# Patient Record
Sex: FEMALE | Employment: UNEMPLOYED | URBAN - METROPOLITAN AREA
[De-identification: names, ages, dates, MRNs, and addresses within clinical notes are randomized per-mention and may not be internally consistent; named-entity substitution may affect disease eponyms.]

---

## 2022-10-13 ENCOUNTER — HOSPITAL ENCOUNTER (INPATIENT)
Age: 22
LOS: 2 days | Discharge: HOME | End: 2022-10-15
Attending: OBSTETRICS & GYNECOLOGY | Admitting: OBSTETRICS & GYNECOLOGY

## 2022-10-13 PROBLEM — O09.90 HIGH-RISK PREGNANCY, UNSPECIFIED TRIMESTER: Status: ACTIVE | Noted: 2022-10-13

## 2022-10-13 LAB
ABO/RH: NORMAL
ABSOLUTE EOS #: 0.07 K/UL (ref 0–0.44)
ABSOLUTE IMMATURE GRANULOCYTE: <0.03 K/UL (ref 0–0.3)
ABSOLUTE LYMPH #: 1.9 K/UL (ref 1.1–3.7)
ABSOLUTE MONO #: 0.72 K/UL (ref 0.1–1.2)
ALBUMIN SERPL-MCNC: 3.3 G/DL (ref 3.5–5.2)
ALBUMIN/GLOBULIN RATIO: 1.3 (ref 1–2.5)
ALP BLD-CCNC: 117 U/L (ref 35–104)
ALT SERPL-CCNC: 17 U/L (ref 5–33)
AMPHETAMINE SCREEN URINE: NEGATIVE
ANION GAP SERPL CALCULATED.3IONS-SCNC: 10 MMOL/L (ref 9–17)
ANTIBODY SCREEN: NEGATIVE
ARM BAND NUMBER: NORMAL
AST SERPL-CCNC: 16 U/L
BARBITURATE SCREEN URINE: NEGATIVE
BASOPHILS # BLD: 0 % (ref 0–2)
BASOPHILS ABSOLUTE: <0.03 K/UL (ref 0–0.2)
BENZODIAZEPINE SCREEN, URINE: NEGATIVE
BILIRUB SERPL-MCNC: 0.2 MG/DL (ref 0.3–1.2)
BUN BLDV-MCNC: 10 MG/DL (ref 6–20)
CALCIUM SERPL-MCNC: 9.2 MG/DL (ref 8.6–10.4)
CANNABINOID SCREEN URINE: NEGATIVE
CHLORIDE BLD-SCNC: 105 MMOL/L (ref 98–107)
CO2: 22 MMOL/L (ref 20–31)
COCAINE METABOLITE, URINE: NEGATIVE
CREAT SERPL-MCNC: 0.53 MG/DL (ref 0.5–0.9)
CREATININE URINE: 109.4 MG/DL (ref 28–217)
EOSINOPHILS RELATIVE PERCENT: 1 % (ref 1–4)
EXPIRATION DATE: NORMAL
FENTANYL URINE: NEGATIVE
GFR SERPL CREATININE-BSD FRML MDRD: >60 ML/MIN/1.73M2
GLUCOSE BLD-MCNC: 92 MG/DL (ref 70–99)
HCT VFR BLD CALC: 36.4 % (ref 36.3–47.1)
HEMOGLOBIN: 12.7 G/DL (ref 11.9–15.1)
HIV AG/AB: NONREACTIVE
IMMATURE GRANULOCYTES: 0 %
LYMPHOCYTES # BLD: 25 % (ref 24–43)
MCH RBC QN AUTO: 29.8 PG (ref 25.2–33.5)
MCHC RBC AUTO-ENTMCNC: 34.9 G/DL (ref 28.4–34.8)
MCV RBC AUTO: 85.4 FL (ref 82.6–102.9)
METHADONE SCREEN, URINE: NEGATIVE
MONOCYTES # BLD: 10 % (ref 3–12)
NRBC AUTOMATED: 0 PER 100 WBC
OPIATES, URINE: NEGATIVE
OXYCODONE SCREEN URINE: NEGATIVE
PDW BLD-RTO: 13.6 % (ref 11.8–14.4)
PHENCYCLIDINE, URINE: NEGATIVE
PLATELET # BLD: 209 K/UL (ref 138–453)
PMV BLD AUTO: 11.8 FL (ref 8.1–13.5)
POTASSIUM SERPL-SCNC: 3.9 MMOL/L (ref 3.7–5.3)
RBC # BLD: 4.26 M/UL (ref 3.95–5.11)
RUBV IGG SER QL: <0.2 IU/ML
SEG NEUTROPHILS: 64 % (ref 36–65)
SEGMENTED NEUTROPHILS ABSOLUTE COUNT: 4.74 K/UL (ref 1.5–8.1)
SODIUM BLD-SCNC: 137 MMOL/L (ref 135–144)
T. PALLIDUM, IGG: NONREACTIVE
TEST INFORMATION: NORMAL
TOTAL PROTEIN, URINE: 12 MG/DL
TOTAL PROTEIN: 5.8 G/DL (ref 6.4–8.3)
URINE TOTAL PROTEIN CREATININE RATIO: 0.11 (ref 0–0.2)
WBC # BLD: 7.5 K/UL (ref 3.5–11.3)

## 2022-10-13 PROCEDURE — 86900 BLOOD TYPING SEROLOGIC ABO: CPT

## 2022-10-13 PROCEDURE — 86901 BLOOD TYPING SEROLOGIC RH(D): CPT

## 2022-10-13 PROCEDURE — 87389 HIV-1 AG W/HIV-1&-2 AB AG IA: CPT

## 2022-10-13 PROCEDURE — 86780 TREPONEMA PALLIDUM: CPT

## 2022-10-13 PROCEDURE — 86803 HEPATITIS C AB TEST: CPT

## 2022-10-13 PROCEDURE — 6370000000 HC RX 637 (ALT 250 FOR IP)

## 2022-10-13 PROCEDURE — 86850 RBC ANTIBODY SCREEN: CPT

## 2022-10-13 PROCEDURE — 2580000003 HC RX 258: Performed by: STUDENT IN AN ORGANIZED HEALTH CARE EDUCATION/TRAINING PROGRAM

## 2022-10-13 PROCEDURE — 80053 COMPREHEN METABOLIC PANEL: CPT

## 2022-10-13 PROCEDURE — 82570 ASSAY OF URINE CREATININE: CPT

## 2022-10-13 PROCEDURE — 85025 COMPLETE CBC W/AUTO DIFF WBC: CPT

## 2022-10-13 PROCEDURE — 84156 ASSAY OF PROTEIN URINE: CPT

## 2022-10-13 PROCEDURE — 80307 DRUG TEST PRSMV CHEM ANLYZR: CPT

## 2022-10-13 PROCEDURE — 86762 RUBELLA ANTIBODY: CPT

## 2022-10-13 PROCEDURE — 3E0DXGC INTRODUCTION OF OTHER THERAPEUTIC SUBSTANCE INTO MOUTH AND PHARYNX, EXTERNAL APPROACH: ICD-10-PCS | Performed by: OBSTETRICS & GYNECOLOGY

## 2022-10-13 PROCEDURE — 1220000000 HC SEMI PRIVATE OB R&B

## 2022-10-13 PROCEDURE — 87340 HEPATITIS B SURFACE AG IA: CPT

## 2022-10-13 PROCEDURE — 3E033VJ INTRODUCTION OF OTHER HORMONE INTO PERIPHERAL VEIN, PERCUTANEOUS APPROACH: ICD-10-PCS | Performed by: OBSTETRICS & GYNECOLOGY

## 2022-10-13 RX ORDER — ONDANSETRON 2 MG/ML
4 INJECTION INTRAMUSCULAR; INTRAVENOUS EVERY 6 HOURS PRN
Status: DISCONTINUED | OUTPATIENT
Start: 2022-10-13 | End: 2022-10-15 | Stop reason: SDUPTHER

## 2022-10-13 RX ORDER — SODIUM CHLORIDE 9 MG/ML
25 INJECTION, SOLUTION INTRAVENOUS PRN
Status: DISCONTINUED | OUTPATIENT
Start: 2022-10-13 | End: 2022-10-15

## 2022-10-13 RX ORDER — LIDOCAINE HYDROCHLORIDE 10 MG/ML
30 INJECTION, SOLUTION EPIDURAL; INFILTRATION; INTRACAUDAL; PERINEURAL PRN
Status: DISCONTINUED | OUTPATIENT
Start: 2022-10-13 | End: 2022-10-15

## 2022-10-13 RX ORDER — ACETAMINOPHEN 500 MG
1000 TABLET ORAL EVERY 4 HOURS PRN
Status: DISCONTINUED | OUTPATIENT
Start: 2022-10-13 | End: 2022-10-15

## 2022-10-13 RX ORDER — SODIUM CHLORIDE 0.9 % (FLUSH) 0.9 %
5-40 SYRINGE (ML) INJECTION EVERY 12 HOURS SCHEDULED
Status: DISCONTINUED | OUTPATIENT
Start: 2022-10-13 | End: 2022-10-15

## 2022-10-13 RX ORDER — DIPHENHYDRAMINE HCL 25 MG
25 TABLET ORAL EVERY 4 HOURS PRN
Status: DISCONTINUED | OUTPATIENT
Start: 2022-10-13 | End: 2022-10-15

## 2022-10-13 RX ORDER — SODIUM CHLORIDE, SODIUM LACTATE, POTASSIUM CHLORIDE, CALCIUM CHLORIDE 600; 310; 30; 20 MG/100ML; MG/100ML; MG/100ML; MG/100ML
INJECTION, SOLUTION INTRAVENOUS CONTINUOUS
Status: DISCONTINUED | OUTPATIENT
Start: 2022-10-13 | End: 2022-10-15

## 2022-10-13 RX ORDER — SODIUM CHLORIDE 0.9 % (FLUSH) 0.9 %
5-40 SYRINGE (ML) INJECTION PRN
Status: DISCONTINUED | OUTPATIENT
Start: 2022-10-13 | End: 2022-10-15

## 2022-10-13 RX ORDER — SODIUM CHLORIDE, SODIUM LACTATE, POTASSIUM CHLORIDE, AND CALCIUM CHLORIDE .6; .31; .03; .02 G/100ML; G/100ML; G/100ML; G/100ML
500 INJECTION, SOLUTION INTRAVENOUS PRN
Status: DISCONTINUED | OUTPATIENT
Start: 2022-10-13 | End: 2022-10-15

## 2022-10-13 RX ORDER — SODIUM CHLORIDE, SODIUM LACTATE, POTASSIUM CHLORIDE, AND CALCIUM CHLORIDE .6; .31; .03; .02 G/100ML; G/100ML; G/100ML; G/100ML
1000 INJECTION, SOLUTION INTRAVENOUS PRN
Status: DISCONTINUED | OUTPATIENT
Start: 2022-10-13 | End: 2022-10-15

## 2022-10-13 RX ADMIN — Medication 25 MCG: at 21:03

## 2022-10-13 RX ADMIN — SODIUM CHLORIDE, POTASSIUM CHLORIDE, SODIUM LACTATE AND CALCIUM CHLORIDE: 600; 310; 30; 20 INJECTION, SOLUTION INTRAVENOUS at 17:55

## 2022-10-13 NOTE — FLOWSHEET NOTE
Pt came in LDR, ambulatory, called for referral of care due to elevated blood pressure at home, pt denies abdominal pain, no vaginal bleeding, no watery discharge or leaking noted, with good fetal movement noted. Pt denies epigastric pain, blurring of vision, dizziness, headache and no nausea ad vomiting.

## 2022-10-13 NOTE — H&P
[facility]    Date: 10/13/2022       Time: 6:43 PM   Patient Name: Qiana Stuart     Patient : 2000  Room/Bed: 0705/0705-01    Admission Date/Time: 10/13/2022  4:11 PM      CC: Elevated blood pressures     HPI: Qiana Stuart is a 20 y.o. Lola Rival at [facility] who presents with elevated blood pressures over the last several days. The patient belongs to the [facility], and was planning a home delivery. During an appointment with midwife, patient had elevated pressures that are self-reported as being in the 079M systolic. After resting, patient reported pressures dropped to 399R systolic. She was referred to outside hospital, which was unable to manage these pressures adequately. At this point, patient was transferred to [facility], and blood pressure on admission was 147/96 and decreased to 131/89. She endorses swelling in the lower extremities, which has been persistent throughout the entirety of her pregnancy. She also reports headache, which she has had since the age of 14. Patient denies visual changes, chest pain, shortness of breath, abdominal pain, trouble urinating, or other symptoms. The patient only received early labs in this pregnancy, which were not provided. The patient reports fetal movement is present, denies contractions, denies loss of fluid, denies vaginal bleeding. Patient denies nausea, vomiting, fever, chills, shortness of breath, chest pain, RUQ pain, abdominal pain, diarrhea, change in color/amount/odor of vaginal discharge, dysuria or, hematuria. DATING:  LMP: Patient's last menstrual period was 2022. Estimated Date of Delivery: None noted.    Based on: best clinical estimate, patient is 38 weeks 6 days gestational age    PREGNANCY RISK FACTORS:  Patient Active Problem List   Diagnosis    High-risk pregnancy, unspecified trimester        Steroids Given In This Pregnancy:  no     REVIEW OF SYSTEMS: Constitutional: negative fever, negative chills, negative weight changes   HEENT: negative visual disturbances, + headaches, negative dizziness, negative hearing loss  Breast: Negative breast abnormalities, negative breast lumps, negative nipple discharge  Respiratory: negative dyspnea, negative cough, negative SOB  Cardiovascular: negative chest pain,  negative palpitations, negative arrhythmia, negative syncope   Gastrointestinal: negative abdominal pain, negative RUQ pain, negative N/V, negative diarrhea, negative constipation, negative bowel changes, negative heartburn   Genitourinary: negative dysuria, negative hematuria, negative urinary incontinence, negative vaginal discharge, negative vaginal bleeding or spotting  Dermatological: negative rash, negative pruritis, negative mole or other skin changes  Hematologic: negative bruising  Immunologic/Lymphatic: negative recent illness, negative recent sick contact  Musculoskeletal: negative back pain, negative myalgias, negative arthralgias  Neurological:  negative dizziness, negative migraines, negative seizures, negative weakness  Behavior/Psych: negative depression, negative anxiety, negative SI, negative HI    OBSTETRIC HISTORY:   OB History    Para Term  AB Living   3 1 0 0 1 0   SAB IAB Ectopic Molar Multiple Live Births   0 0 0 0 0 0      # Outcome Date GA Lbr Chirag/2nd Weight Sex Delivery Anes PTL Lv   3 Current            2 AB            1 Para                PAST MEDICAL HISTORY:   has no past medical history on file. PAST SURGICAL HISTORY:   has no past surgical history on file. ALLERGIES:  has No Known Allergies. MEDICATIONS:  Prior to Admission medications    Not on File       FAMILY HISTORY:  family history includes Diabetes in her mother; Heart Surgery in her sister; Hypertension in her mother. SOCIAL HISTORY:   reports that she has never smoked.  She has never used smokeless tobacco. She reports that she does not drink alcohol and does not use drugs. VITALS:  Vitals:    10/13/22 1625 10/13/22 1639 10/13/22 1758   BP: (!) 147/96 (!) 141/95 131/89   Pulse: 90 90 91   Resp: 16 17 18   Temp: 98.2 °F (36.8 °C)     TempSrc: Oral     SpO2: 99% 99%            PHYSICAL EXAM:  Fetal Heart Monitor:  Baseline Heart Rate 155, moderate variability, present accelerations, absent decelerations  [place]: contractions absent  General appearance:  no apparent distress, alert and cooperative  HEENT: head atraumatic, normocephalic, trachea midline, moist mucous membranes    Neurologic:  oriented, normal speech, no focal findings or movement disorder noted  Lungs:  no increased work of breathing, good air exchange. No use of accessory muscle, no cyanosis. Heart:  regular rate, no cyanosis. Patient wearing compression stockings, unable to appreciate edema. Abdomen:  soft, gravid, non-tender on palpation, no right upper quadrant tenderness, uterus non-tender, no signs of abruption and no signs of chorioamnionitis  Extremities:  no calf tenderness bilaterally, non-edematous bilaterally   Musculoskeletal: no gross abnormalities, range of motion appropriate for age   Psychiatric: mood appropriate, normal affect   Rectal Exam: not indicated  Pelvic Exam: Not indicated      DATA:  Membranes Ruptured: No    LIMITED BEDSIDE US: Not performed    PRENATAL LAB RESULTS:     Patient does not have prenatal labs through the [facility] system, and was unable to provide records. She endorsed undergoing laboratory studies early in pregnancy, but was unable to provide a record. P/C ratio, UDS, type and screen, CMP, T pal, CBC ordered on admission.     ASSESSMENT & PLAN:  Guilherme Hoyos is a 20 y.o. female  at 38w6d presents for elevated pressures   - GBS unknown / Rh positive / R unknown   - No antibiotic prophylaxis   - LR @ 125 cc/hr   - CEFM/TOCO showing category 1 tracing   - N.p.o. diet   - Blood pressures elevated, nonsevere range   - P/C ratio, UDS pending   - Type and screen, CMP, T. Pal, CBC ordered   - Preeclampsia labs WNL   - Patient will continue to be evaluated   - Induction of labor secondary to gestational hypertension   - We will continue to follow    Patient Active Problem List    Diagnosis Date Noted    High-risk pregnancy, unspecified trimester 10/13/2022     Priority: Medium       Plan discussed with Dr. Lisbet eD La O, who is agreeable. Steroids given this admission: No    Risks, benefits, alternatives and possible complications have been discussed in detail with the patient. Admission, and post admission procedures and expectations were discussed in detail. All questions were answered.     Attending's Name: Dr. Estella Ellis MD  Ob/Gyn Resident  10/13/2022, 6:43 PM

## 2022-10-13 NOTE — PLAN OF CARE
Problem: Vaginal Birth or  Section  Goal: Fetal and maternal status remain reassuring during the birth process  Description:  Birth OB-Pregnancy care plan goal which identifies if the fetal and maternal status remain reassuring during the birth process  Outcome: Progressing     Problem: Pain  Goal: Verbalizes/displays adequate comfort level or baseline comfort level  Outcome: Progressing     Problem: Safety - Adult  Goal: Free from fall injury  Outcome: Progressing

## 2022-10-14 PROBLEM — O13.9 GESTATIONAL HYPERTENSION: Status: ACTIVE | Noted: 2022-10-14

## 2022-10-14 LAB
HEPATITIS B SURFACE ANTIGEN: NONREACTIVE
HEPATITIS C ANTIBODY: NONREACTIVE

## 2022-10-14 PROCEDURE — 6370000000 HC RX 637 (ALT 250 FOR IP)

## 2022-10-14 PROCEDURE — 6360000002 HC RX W HCPCS: Performed by: STUDENT IN AN ORGANIZED HEALTH CARE EDUCATION/TRAINING PROGRAM

## 2022-10-14 PROCEDURE — 1220000000 HC SEMI PRIVATE OB R&B

## 2022-10-14 PROCEDURE — 2580000003 HC RX 258: Performed by: STUDENT IN AN ORGANIZED HEALTH CARE EDUCATION/TRAINING PROGRAM

## 2022-10-14 PROCEDURE — 6370000000 HC RX 637 (ALT 250 FOR IP): Performed by: STUDENT IN AN ORGANIZED HEALTH CARE EDUCATION/TRAINING PROGRAM

## 2022-10-14 RX ADMIN — SODIUM CHLORIDE, POTASSIUM CHLORIDE, SODIUM LACTATE AND CALCIUM CHLORIDE: 600; 310; 30; 20 INJECTION, SOLUTION INTRAVENOUS at 10:18

## 2022-10-14 RX ADMIN — SODIUM CHLORIDE, POTASSIUM CHLORIDE, SODIUM LACTATE AND CALCIUM CHLORIDE: 600; 310; 30; 20 INJECTION, SOLUTION INTRAVENOUS at 02:02

## 2022-10-14 RX ADMIN — Medication 1 MILLI-UNITS/MIN: at 09:47

## 2022-10-14 RX ADMIN — Medication 25 MCG: at 02:03

## 2022-10-14 RX ADMIN — ACETAMINOPHEN 1000 MG: 500 TABLET ORAL at 00:26

## 2022-10-14 NOTE — FLOWSHEET NOTE
at bedside for SVE as requested by patient. Patient and family stated they are considering going home.  updated patient and family on POC if we are to continue, patient verbalizes understanding and will call once they have made a decision.

## 2022-10-14 NOTE — FLOWSHEET NOTE
Patient made decision to go home.  at bedside to again, discuss risks/benefits of staying to be delivered vs going home on her own accord. Patient and family to make decision.

## 2022-10-14 NOTE — DISCHARGE SUMMARY
Obstetric Discharge Summary  [facility]    Patient Name: Deshaun Hough  Patient : 2000  Primary Care Physician: No primary care provider on file. Admit Date: 10/13/2022    Principal Diagnosis: IUP at 39w0d, admitted for gHTN (new dx)     Her pregnancy has been complicated by:   Patient Active Problem List   Diagnosis    Gestational hypertension (G2)     10/15/22 F Apg 8/9 Wt 7#1    39 weeks gestation of pregnancy       Infection Present?: No  Hospital Acquired: N/a    Surgical Operations & Procedures:  Analgesia: epidural  Delivery Type: Spontaneous Vaginal Delivery: See Labor and Delivery Summary   Laceration(s): First degree episiotomy. Suture used for repair:  Vicryl 3.0. Consultations: Anesthesia    Pertinent Findings & Procedures:   Deshaun Hough is a 20 y.o. female  at 39w0d admitted for IOL 2/2 gHTN (new dx); Her PreE labs were WNL P/C 0.11. received Cytotec 25 PO x2, Pitocin, SROM (clr), nubain x1, and epidural.    She delivered by spontaneous vaginal a Live Born infant on 10/15/22. Apgars: 8 at 1 minute and 9 at 5 minutes. Postpartum course: normal.      Course of patient: complicated by gestational hypertension.     Discharge to: home    Readmission planned: no     Recommendations on Discharge:     Medications:      Medication List        START taking these medications      docusate sodium 100 MG capsule  Commonly known as: COLACE  Take 1 capsule by mouth 2 times daily as needed for Constipation     ibuprofen 600 MG tablet  Commonly known as: ADVIL;MOTRIN  Take 1 tablet by mouth every 6 hours as needed for Pain               Where to Get Your Medications        These medications were sent to [place] [id number] [facility], [address] [place] [address], [address] [address]      Phone: [phone]   docusate sodium 100 MG capsule  ibuprofen 600 MG tablet           Activity: pelvic rest x 6 weeks  Diet: regular diet  Follow up: 1 week for BP check    Condition on discharge: stable    Discharge date: 10/15/22      Tabatha Damon DO  OB/GYN Resident, PGY3  [facility]  10/15/2022 10:59 AM      Comments:  Home care and follow-up care were reviewed. Pelvic rest, and birth control were reviewed. Signs and symptoms of mastitis and post partum depression were reviewed. The patient is to notify her physician if any of these occur. The patient was counseled on secondary smoke risks and the increased risk of sudden infant death syndrome and respiratory problems to her baby with exposure. She was counseled on various alternate recommendations to decrease the exposure to secondary smoke to her children.

## 2022-10-14 NOTE — CARE COORDINATION
ANTEPARTUM NOTE    High-risk pregnancy, unspecified trimester [O09.90]    Parker Barnett was admitted to L&D on 10/13/2022 for IOL 2/2 gestational HTN @ 45 6/7    OB GYN Provider: Pt was going to deliver at home. [facility]    Will meet with patient after delivery to verify name/address/phone/insurance and discuss discharge planning. Anticipate DC home 2 nights after vaginal delivery or 4 nights after C/S delivery as long as hemodynamically stable.

## 2022-10-14 NOTE — PROGRESS NOTES
Labor Progress Note    Walt Monteiro is a 20 y.o. female  at 38w7d  The patient was seen and examined. Her pain is well controlled. She reports fetal movement is present, denies contractions, denies loss of fluid, denies vaginal bleeding.        Vital Signs:  Vitals:    10/13/22 2142 10/14/22 0009 10/14/22 0200 10/14/22 0628   BP: 130/73 135/84 132/79 (!) 151/94   Pulse: 59 76 69 70   Resp: 18  16    Temp: 98.4 °F (36.9 °C)  98.1 °F (36.7 °C) 97.5 °F (36.4 °C)   TempSrc: Oral  Oral Oral   SpO2: 98%            FHT: 130, moderate variability, accelerations present, decelerations absent  Contractions: irregular    Chaperone for Intimate Exam: Chaperone was present for entire exam, Chaperone Name: Mo Romano RN   Cervical Exam: 2 cm dilated, 70 effaced, -2 station  Pitocin: @ 0 mu/min    Membranes: Intact  Scalp Electrode in place: absent  Intrauterine Pressure Catheter in Place: absent    Interventions: SVE    Assessment/Plan:  Walt Monteiro is a 20 y.o. female  at 38w7d admitted for IOL 2/2 gHTN   - GBS unknown, Pen G for GBS prophylaxis   - VSS, Afebrile    - cEFM/TOCO   - SVE /-3   - Cytotec PO 25 mcg x 2, plan to give third dose now   - Patient declines GBS and GC/C testing    - Will continue to monitor     Senior resident updated and in agreement with plan    Clarecne Fink DO  Ob/Gyn Resident  10/14/2022, 6:39 AM

## 2022-10-14 NOTE — PROGRESS NOTES
OB/GYN Resident Interval Note    Patient has decided to stay and proceed with induction of labor. Patient agreeable to Pitocin at this time. Pitocin ordered. We will continue to monitor closely.     Elida Chopra, DO  OB/GYN Resident

## 2022-10-14 NOTE — FLOWSHEET NOTE
Patient and family decided they would like to go home.  called and updated, will get AMA form prepared. Patient taken off monitor at this time and continuous IV fluids d/c'd.

## 2022-10-14 NOTE — PROGRESS NOTES
Labor Progress Note    Abelino Gregorio is a 20 y.o. female  at 39w0d  The patient was seen and examined. Her pain is well controlled. She reports fetal movement is present, complains of contractions, denies loss of fluid, denies vaginal bleeding.        Vital Signs:  Vitals:    10/14/22 1202 10/14/22 1300 10/14/22 1500 10/14/22 1600   BP: (!) 137/92 (!) 144/94 128/77 (!) 154/100   Pulse: 76 81 68 73   Resp: 18 18 18 18   Temp:       TempSrc:       SpO2:             FHT: 150, moderate variability, accelerations present, decelerations absent  Contractions: regular, every 2-3 minutes    Chaperone for Intimate Exam: Deferred at this time  Pitocin: @ 14 mu/min    Membranes: intact  Scalp Electrode in place: absent  Intrauterine Pressure Catheter in Place: absent    Interventions: none    Assessment/Plan:  Abelino Gregorio is a 20 y.o. female  at 39w0d admitted for IOL 2/2 gHTN (new dx)   - GBS Unknown, Pen G for GBS prophylaxis however patient refused   - VSS, Afebrile  - cEFM/TOCO  - S/p Cytotec 25 mcg PO x 1  - Pitocin per protocol   - Anticipate vaginal delivery  - Continue to monitor          Attending updated and in agreement with plan    Sofia Ross DO  Ob/Gyn Resident  10/14/2022, 6:49 PM

## 2022-10-14 NOTE — PROGRESS NOTES
Labor Progress Note    Madhu Juarez is a 20 y.o. female  at 38w7d  The patient was seen and examined. Her pain is well controlled. She reports fetal movement is present, denies contractions, denies loss of fluid, denies vaginal bleeding.        Vital Signs:  Vitals:    10/13/22 1758 10/13/22 1948 10/13/22 2142 10/14/22 0009   BP: 131/89 (!) 139/91 130/73 135/84   Pulse: 91 76 59 76   Resp: 18 18 18    Temp:  98.2 °F (36.8 °C) 98.4 °F (36.9 °C)    TempSrc:  Oral Oral    SpO2:   98%          FHT: 130, moderate variability, accelerations present, decelerations absent  Contractions: irregular    Chaperone for Intimate Exam: Chaperone was present for entire exam, Chaperone Name: Stephany Stockton RN   Cervical Exam: 1 cm dilated, 50 effaced, -3 station  Pitocin: @ 0 mu/min    Membranes: Intact  Scalp Electrode in place: absent  Intrauterine Pressure Catheter in Place: absent    Interventions: SVE    Assessment/Plan:  Madhu Juarez is a 20 y.o. female  at 38w7d admitted for IOL 2/2 gHTN   - GBS unknown, Pen G for GBS prophylaxis   - VSS, Afebrile    - cEFM/TOCO   - SVE /-3   - Cytotec PO 25 mcg x 1, plan to give second dose now   - Patient declines GBS and GC/C testing    - Will continue to monitor     Senior resident updated and in agreement with plan    Lon Montenegro DO  Ob/Gyn Resident  10/14/2022, 2:29 AM

## 2022-10-14 NOTE — DISCHARGE SUMMARY
Obstetric Discharge Summary  [facility]    Patient Name: Judit Blount  Patient : 2000  Primary Care Physician: No primary care provider on file. Admit Date: 10/13/2022    Principal Diagnosis: IUP at 39w0d, admitted for gHTN (new dx)     Her pregnancy has been complicated by:   Patient Active Problem List   Diagnosis    High-risk pregnancy, unspecified trimester    Gestational hypertension (G2)       Infection Present?: No  Hospital Acquired: N/a        Pertinent Findings & Procedures:   Judit Blount is a 20 y.o. female  at 39w0d admitted for IOL 2/2 gHTN (new dx); Her PreE labs were WNL P/C 0.11. received Cytotec 25 PO x2. Patient decided to leave  [address] after second dose of Cytotec. She was thoroughly counseled on maternal and fetal risk including death/injury. Patient voiced understanding and still wished to leave AMA. Patient signed AMA papers and left the unit. Course of patient: complicated by gestational hypertension and leaving  [address]    Discharge to: AMA    Readmission planned: no     Recommendations on Discharge:     Medications:      Medication List      You have not been prescribed any medications. Activity: Patient counseled on adequate PO hydration and fetal kick counts. All questions and concerns addressed. Patient is aware that she should return to the hospital if she has worsening contractions, LOF, VB or decreased fetal movements. She voices understanding. Patient is aware that she should return to hospital if she experiences unrelenting headache, vision changes, RUQ pain, nausea, vomiting, and peripheral edema. She voices understanding. Diet: regular diet  Follow up:  2 days  for BP check    Condition on discharge: stable    Discharge date (AMA): 10/14/22    Ashly Franco DO  Ob/Gyn Resident    Comments:  Home care and follow-up care were reviewed.

## 2022-10-14 NOTE — PROGRESS NOTES
Ob Attending     Patient is a 21 yo  at 39w0d who presented after being managed by home birth midwife and having elevated blood pressures. At this time had diagnosis of gestational hypertension. Is now s/p Cytotec for cervical ripening. Initially discussing leaving AMA as she desires to deliver at home and blood pressures have been stable. She has not hat severe range blood pressures. Discussed at length that with diagnosis of gestational hypertension treatment is delivery as placenta is thought to be contributing factor and that if she leaves I would still recommend delivery via induction of labor. Questions we discussed for her to address with her home birth team are how they would manage her induction and how they would manage blood pressure elevations given known diagnosis of gestational hypertension and potential for progression to pre-eclampsia or eclampsia. Many questions answered including typical  section rates (for multiparous patient approximately less than 30%). We also discussed IOL plan and cytotec vs pitocin, AROM and timing and continued monitoring. After several pauses in our discussion for the family to have private conversations and consult with their home birth midwife she has opted to stay and would like to continue with pitocin for IOL. All questions answered. Shared decision making dicussed and our recommendations and the rational for those recommendations reviewed at length. Continue IOL. FHR remains category 1.      Laurel Junior MD

## 2022-10-14 NOTE — PROGRESS NOTES
Labor Progress Note    Mateusz Green is a 20 y.o. female  at 39w0d  The patient was seen and examined. Her pain is well controlled. She reports fetal movement is present, denies contractions, denies loss of fluid, denies vaginal bleeding. Vital Signs:  Vitals:    10/14/22 0009 10/14/22 0200 10/14/22 0628 10/14/22 0723   BP: 135/84 132/79 (!) 151/94 136/88   Pulse: 76 69 70 62   Resp:  16  18   Temp:  98.1 °F (36.7 °C) 97.5 °F (36.4 °C)    TempSrc:  Oral Oral    SpO2:             FHT:  125 , moderate variability, accelerations present, decelerations absent  Contractions: uterine irritability    Chaperone for Intimate Exam: Chaperone was present for entire exam, Chaperone Name: Eulis Hashimoto RN  Cervical Exam: 3 cm dilated, 70 effaced, -2 station  Pitocin: @ 0 mu/min    Membranes: Intact  Scalp Electrode in place: absent  Intrauterine Pressure Catheter in Place: absent    Interventions: none    Assessment/Plan:  Mateusz Green is a 20 y.o. female  at 39w0d admitted for IOL 2/2 gHTN   - GBS unk, No indication for GBS prophylaxis   - Blood pressures elevated, no severe ranges   - cEFM/TOCO - Cat 1 tracing   - S/p Cytotec 25mcg PO x3   - SVE 3/70/-1   -Spoke with patient, at this point we would recommend starting Pitocin as cervix is favorable. Also discussed AROM. Patient states she may want to go home and labor instead. Explained to patient that she is now 44 weeks and with her diagnosis of gestational hypertension delivery is recommended at 37 weeks due to risks to both mother and fetus. Writer advised patient that we would not recommend going home at this point and our medical advice would be to continue with induction of labor. Patient has continued to have elevated blood pressures that we are monitoring closely. Patient was thoroughly advised about the risk of preeclampsia and eclampsia as well.   Patient thoroughly counseled on the risk of maternal and/or fetal death/injury with leaving against medical advice. Patient voices understanding and states she will talk to her family and make a decision. Again reiterated to patient that we would recommend continue of induction of labor due to risk to both mother and fetus. Patient voices understanding.         Attending updated and in agreement with moisés Domingo DO  Ob/Gyn Resident  10/14/2022, 8:19 AM

## 2022-10-14 NOTE — PROGRESS NOTES
Labor Progress Note    Amaury Zayas is a 20 y.o. female  at 38w7d  The patient was seen and examined. Her pain is well controlled. She reports fetal movement is present, denies contractions, denies loss of fluid, denies vaginal bleeding.        Vital Signs:  Vitals:    10/13/22 1625 10/13/22 1639 10/13/22 1758 10/13/22 1948   BP: (!) 147/96 (!) 141/95 131/89 (!) 139/91   Pulse: 90 90 91 76   Resp: 16 17 18 18   Temp: 98.2 °F (36.8 °C)   98.2 °F (36.8 °C)   TempSrc: Oral   Oral   SpO2: 99% 99%           FHT: 130, moderate variability, accelerations present, decelerations absent  Contractions: irregular    Chaperone for Intimate Exam: Chaperone was present for entire exam, Chaperone Name: Haven Adames RN   Cervical Exam: 1 cm dilated, 50 effaced, -3 station  Pitocin: @ 0 mu/min    Membranes: Intact  Scalp Electrode in place: absent  Intrauterine Pressure Catheter in Place: absent    Interventions: SVE    Assessment/Plan:  Amaury Zayas is a 20 y.o. female  at 38w7d admitted for IOL 2/2 gHTN   - GBS unknown, Pen G for GBS prophylaxis   - VSS, Afebrile    - cEFM/TOCO   - SVE /-3, plan for cytotec 25 mcg PO x 1   - Plan for Cytotec PO 25 mcg x 1    - Patient declines GBS and GC/C testing    - Will continue to monitor     Senior resident updated and in agreement with [address], DO  Ob/Gyn Resident  10/13/2022, 9:24 PM

## 2022-10-15 ENCOUNTER — ANESTHESIA EVENT (OUTPATIENT)
Dept: LABOR AND DELIVERY | Age: 22
End: 2022-10-15

## 2022-10-15 ENCOUNTER — ANESTHESIA (OUTPATIENT)
Dept: LABOR AND DELIVERY | Age: 22
End: 2022-10-15

## 2022-10-15 VITALS
OXYGEN SATURATION: 99 % | HEART RATE: 69 BPM | TEMPERATURE: 97.7 F | RESPIRATION RATE: 16 BRPM | SYSTOLIC BLOOD PRESSURE: 121 MMHG | DIASTOLIC BLOOD PRESSURE: 81 MMHG

## 2022-10-15 PROBLEM — O09.90 HIGH-RISK PREGNANCY, UNSPECIFIED TRIMESTER: Status: RESOLVED | Noted: 2022-10-13 | Resolved: 2022-10-15

## 2022-10-15 PROBLEM — Z3A.39 39 WEEKS GESTATION OF PREGNANCY: Status: ACTIVE | Noted: 2022-10-15

## 2022-10-15 PROCEDURE — 2500000003 HC RX 250 WO HCPCS: Performed by: NURSE ANESTHETIST, CERTIFIED REGISTERED

## 2022-10-15 PROCEDURE — 6360000002 HC RX W HCPCS

## 2022-10-15 PROCEDURE — 6360000002 HC RX W HCPCS: Performed by: STUDENT IN AN ORGANIZED HEALTH CARE EDUCATION/TRAINING PROGRAM

## 2022-10-15 PROCEDURE — 96374 THER/PROPH/DIAG INJ IV PUSH: CPT

## 2022-10-15 PROCEDURE — 7200000001 HC VAGINAL DELIVERY

## 2022-10-15 PROCEDURE — 3700000025 EPIDURAL BLOCK: Performed by: STUDENT IN AN ORGANIZED HEALTH CARE EDUCATION/TRAINING PROGRAM

## 2022-10-15 PROCEDURE — 2580000003 HC RX 258: Performed by: STUDENT IN AN ORGANIZED HEALTH CARE EDUCATION/TRAINING PROGRAM

## 2022-10-15 PROCEDURE — 6370000000 HC RX 637 (ALT 250 FOR IP): Performed by: STUDENT IN AN ORGANIZED HEALTH CARE EDUCATION/TRAINING PROGRAM

## 2022-10-15 RX ORDER — ONDANSETRON 2 MG/ML
4 INJECTION INTRAMUSCULAR; INTRAVENOUS EVERY 6 HOURS PRN
Status: DISCONTINUED | OUTPATIENT
Start: 2022-10-15 | End: 2022-10-15

## 2022-10-15 RX ORDER — NALBUPHINE HYDROCHLORIDE 20 MG/ML
5 INJECTION, SOLUTION INTRAMUSCULAR; INTRAVENOUS; SUBCUTANEOUS EVERY 4 HOURS PRN
Status: DISCONTINUED | OUTPATIENT
Start: 2022-10-15 | End: 2022-10-15

## 2022-10-15 RX ORDER — HYDROCORTISONE 25 MG/G
CREAM TOPICAL
Status: DISCONTINUED | OUTPATIENT
Start: 2022-10-15 | End: 2022-10-15 | Stop reason: HOSPADM

## 2022-10-15 RX ORDER — SODIUM CHLORIDE 0.9 % (FLUSH) 0.9 %
5-40 SYRINGE (ML) INJECTION EVERY 12 HOURS SCHEDULED
Status: DISCONTINUED | OUTPATIENT
Start: 2022-10-15 | End: 2022-10-15 | Stop reason: HOSPADM

## 2022-10-15 RX ORDER — SODIUM CHLORIDE 9 MG/ML
INJECTION, SOLUTION INTRAVENOUS PRN
Status: DISCONTINUED | OUTPATIENT
Start: 2022-10-15 | End: 2022-10-15 | Stop reason: HOSPADM

## 2022-10-15 RX ORDER — ONDANSETRON 4 MG/1
4 TABLET, ORALLY DISINTEGRATING ORAL EVERY 4 HOURS PRN
Status: DISCONTINUED | OUTPATIENT
Start: 2022-10-15 | End: 2022-10-15 | Stop reason: HOSPADM

## 2022-10-15 RX ORDER — SODIUM CHLORIDE 0.9 % (FLUSH) 0.9 %
5-40 SYRINGE (ML) INJECTION PRN
Status: DISCONTINUED | OUTPATIENT
Start: 2022-10-15 | End: 2022-10-15 | Stop reason: HOSPADM

## 2022-10-15 RX ORDER — ONDANSETRON 2 MG/ML
4 INJECTION INTRAMUSCULAR; INTRAVENOUS EVERY 6 HOURS PRN
Status: DISCONTINUED | OUTPATIENT
Start: 2022-10-15 | End: 2022-10-15 | Stop reason: HOSPADM

## 2022-10-15 RX ORDER — ROPIVACAINE HYDROCHLORIDE 2 MG/ML
INJECTION, SOLUTION EPIDURAL; INFILTRATION; PERINEURAL
Status: COMPLETED
Start: 2022-10-15 | End: 2022-10-15

## 2022-10-15 RX ORDER — IBUPROFEN 600 MG/1
600 TABLET ORAL EVERY 6 HOURS PRN
Status: DISCONTINUED | OUTPATIENT
Start: 2022-10-15 | End: 2022-10-15 | Stop reason: HOSPADM

## 2022-10-15 RX ORDER — DOCUSATE SODIUM 100 MG/1
100 CAPSULE, LIQUID FILLED ORAL 2 TIMES DAILY PRN
Qty: 60 CAPSULE | Refills: 1 | Status: SHIPPED | OUTPATIENT
Start: 2022-10-15 | End: 2022-11-14

## 2022-10-15 RX ORDER — LANOLIN 72 %
OINTMENT (GRAM) TOPICAL PRN
Status: DISCONTINUED | OUTPATIENT
Start: 2022-10-15 | End: 2022-10-15 | Stop reason: HOSPADM

## 2022-10-15 RX ORDER — NALBUPHINE HYDROCHLORIDE 20 MG/ML
10 INJECTION, SOLUTION INTRAMUSCULAR; INTRAVENOUS; SUBCUTANEOUS ONCE
Status: COMPLETED | OUTPATIENT
Start: 2022-10-15 | End: 2022-10-15

## 2022-10-15 RX ORDER — SENNA AND DOCUSATE SODIUM 50; 8.6 MG/1; MG/1
1 TABLET, FILM COATED ORAL DAILY
Status: DISCONTINUED | OUTPATIENT
Start: 2022-10-15 | End: 2022-10-15 | Stop reason: HOSPADM

## 2022-10-15 RX ORDER — SIMETHICONE 80 MG
80 TABLET,CHEWABLE ORAL EVERY 6 HOURS PRN
Status: DISCONTINUED | OUTPATIENT
Start: 2022-10-15 | End: 2022-10-15 | Stop reason: HOSPADM

## 2022-10-15 RX ORDER — NALOXONE HYDROCHLORIDE 0.4 MG/ML
INJECTION, SOLUTION INTRAMUSCULAR; INTRAVENOUS; SUBCUTANEOUS PRN
Status: DISCONTINUED | OUTPATIENT
Start: 2022-10-15 | End: 2022-10-15

## 2022-10-15 RX ORDER — IBUPROFEN 600 MG/1
600 TABLET ORAL EVERY 6 HOURS PRN
Qty: 30 TABLET | Refills: 1 | Status: SHIPPED | OUTPATIENT
Start: 2022-10-15 | End: 2022-11-14

## 2022-10-15 RX ORDER — LIDOCAINE HYDROCHLORIDE AND EPINEPHRINE 20; 5 MG/ML; UG/ML
INJECTION, SOLUTION EPIDURAL; INFILTRATION; INTRACAUDAL; PERINEURAL PRN
Status: DISCONTINUED | OUTPATIENT
Start: 2022-10-15 | End: 2022-10-15 | Stop reason: SDUPTHER

## 2022-10-15 RX ORDER — ACETAMINOPHEN 500 MG
1000 TABLET ORAL EVERY 6 HOURS PRN
Status: DISCONTINUED | OUTPATIENT
Start: 2022-10-15 | End: 2022-10-15 | Stop reason: HOSPADM

## 2022-10-15 RX ADMIN — DOCUSATE SODIUM 50 MG AND SENNOSIDES 8.6 MG 1 TABLET: 8.6; 5 TABLET, FILM COATED ORAL at 08:39

## 2022-10-15 RX ADMIN — LIDOCAINE HYDROCHLORIDE,EPINEPHRINE BITARTRATE 3 ML: 20; .005 INJECTION, SOLUTION EPIDURAL; INFILTRATION; INTRACAUDAL; PERINEURAL at 02:20

## 2022-10-15 RX ADMIN — ACETAMINOPHEN 1000 MG: 500 TABLET ORAL at 05:23

## 2022-10-15 RX ADMIN — Medication 166.7 ML: at 02:53

## 2022-10-15 RX ADMIN — ROPIVACAINE HYDROCHLORIDE 10 ML/HR: 2 INJECTION, SOLUTION EPIDURAL; INFILTRATION at 02:28

## 2022-10-15 RX ADMIN — SODIUM CHLORIDE, PRESERVATIVE FREE 10 ML: 5 INJECTION INTRAVENOUS at 08:39

## 2022-10-15 RX ADMIN — NALBUPHINE HYDROCHLORIDE 10 MG: 20 INJECTION, SOLUTION INTRAMUSCULAR; INTRAVENOUS; SUBCUTANEOUS at 00:19

## 2022-10-15 ASSESSMENT — PAIN SCALES - GENERAL
PAINLEVEL_OUTOF10: 7
PAINLEVEL_OUTOF10: 2

## 2022-10-15 NOTE — PLAN OF CARE
Problem: Vaginal Birth or  Section  Goal: Fetal and maternal status remain reassuring during the birth process  Description:  Birth OB-Pregnancy care plan goal which identifies if the fetal and maternal status remain reassuring during the birth process  Outcome: Adequate for Discharge     Problem: Pain  Goal: Verbalizes/displays adequate comfort level or baseline comfort level  Outcome: Adequate for Discharge     Problem: Safety - Adult  Goal: Free from fall injury  Outcome: Adequate for Discharge   Patient is being discharged per request due to [employer] preference. Understand plan of care.

## 2022-10-15 NOTE — FLOWSHEET NOTE
Franklin Chaney from  on floor and writer updated Franklin Chaney on patient intension of leaving and signing out infant AMA due to Nick Zoroastrian. Franklin Chaney states she will stop in and speak to patient.

## 2022-10-15 NOTE — ANESTHESIA PROCEDURE NOTES
Epidural Block    Patient location during procedure: OB  Start time: 10/15/2022 2:20 AM  Reason for block: labor epidural  Staffing  Performed: resident/CRNA   Resident/CRNA: FIDE Salomon CRNA  Epidural  Patient position: sitting  Prep: Betadine  Patient monitoring: continuous pulse ox and frequent blood pressure checks  Approach: midline  Location: L3-4  Injection technique: BRISSA air  Guidance: paresthesia technique  Provider prep: mask and sterile gloves  Needle  Needle type: Tuohy   Needle gauge: 17 G  Needle length: 3.5 in  Needle insertion depth: 6 cm  Catheter type: side hole  Catheter size: 20 G  Catheter at skin depth: 11 cm  Test dose: negativeCatheter Secured: tegaderm and tape  Assessment  Sensory level: T6  Hemodynamics: stable  Attempts: 1  Outcomes: uncomplicated and patient tolerated procedure well  Preanesthetic Checklist  Completed: patient identified, IV checked, site marked, risks and benefits discussed, surgical/procedural consents, equipment checked, pre-op evaluation, timeout performed, anesthesia consent given, oxygen available and monitors applied/VS acknowledged

## 2022-10-15 NOTE — DISCHARGE INSTRUCTIONS
Postpartum: Care Instructions  Overview  After childbirth (postpartum period), your body goes through many changes. Some of these changes happen over several weeks. In the hours after delivery, your body will begin to recover from childbirth while it prepares to breastfeed your . You may feel emotional during this time. Your hormones can shift your mood without warning for no clear reason. In the first couple of weeks after childbirth, it's common to have emotions that change from happy to sad. You may find it hard to sleep. You may cry a lot. This is called the \"baby blues. \" These overwhelming emotions often go away within a couple of days or weeks. But it's important to discuss your feelings with your doctor. It's easy to get too tired and overwhelmed during the first weeks after childbirth. Don't try to do too much. Get rest whenever you can, accept help from others, and eat well and drink plenty of fluids. In the first couple of weeks after you give birth, your doctor or midwife may want to check in with you and make a plan for any follow-up care you may need. You will likely have a complete postpartum visit in the first 3 months after delivery. At that time, your doctor or midwife will check on your recovery from childbirth and see how you're doing with your emotions. You may also discuss your concerns or questions. Follow-up care is a key part of your treatment and safety. Be sure to make and go to all appointments, and call your doctor if you are having problems. It's also a good idea to know your test results and keep a list of the medicines you take. How can you care for yourself at home? Sleep or rest when your baby sleeps. Get help with household chores from family or friends, if you can. Don't try to do it all yourself. If you have hemorrhoids or swelling or pain around the opening of your vagina, try using cold and heat.  You can put ice or a cold pack on the area for 10 to 20 minutes at a time. Put a thin cloth between the ice and your skin. Also try sitting in a few inches of warm water (sitz bath) 3 times a day and after bowel movements. Take pain medicines exactly as directed. If the doctor gave you a prescription medicine for pain, take it as prescribed. If you are not taking a prescription pain medicine, ask your doctor if you can take an over-the-counter medicine. Eat more fiber to avoid constipation. Include foods such as whole-grain breads and cereals, raw vegetables, raw and dried fruits, and beans. Drink plenty of fluids. If you have kidney, heart, or liver disease and have to limit fluids, talk with your doctor before you increase the amount of fluids you drink. Do not rinse inside your vagina with fluids (douche). If you have stitches, keep the area clean by pouring or spraying warm water over the area outside your vagina and anus after you use the toilet. Keep a list of questions to ask your doctor or midwife. Your questions might be about:  Changes in your breasts, such as lumps or soreness. When to expect your menstrual period to start again. What form of birth control is best for you. Weight you have put on during the pregnancy. Exercise options. What foods and drinks are best for you, especially if you are breastfeeding. Problems you might be having with breastfeeding. When you can have sex. You may want to talk about lubricants for your vagina. Any feelings of sadness or restlessness that you are having. When should you call for help? Share this information with your partner, family, or a friend. They can help you watch for warning signs. Call 911  anytime you think you may need emergency care. For example, call if:    You have thoughts of harming yourself, your baby, or another person. You passed out (lost consciousness). You have chest pain, are short of breath, or cough up blood. You have a seizure.    Call your doctor now or seek immediate medical care if:    You have signs of hemorrhage (too much bleeding), such as:  Heavy vaginal bleeding. This means that you are soaking through one or more pads in an hour. Or you pass blood clots bigger than an egg. Feeling dizzy or lightheaded, or you feel like you may faint. Feeling so tired or weak that you cannot do your usual activities. A fast or irregular heartbeat. New or worse belly pain. You have signs of infection, such as:  A fever. Vaginal discharge that smells bad. New or worse belly pain. You have symptoms of a blood clot in your leg (called a deep vein thrombosis), such as:  Pain in the calf, back of the knee, thigh, or groin. Redness and swelling in your leg or groin. You have signs of preeclampsia, such as:  Sudden swelling of your face, hands, or feet. New vision problems (such as dimness, blurring, or seeing spots). A severe headache. Watch closely for changes in your health, and be sure to contact your doctor if:    Your vaginal bleeding isn't decreasing. You feel sad, anxious, or hopeless for more than a few days. You are having problems with your breasts or breastfeeding. Where can you learn more? Go to https://PV Evolution LabspeSay-Hey.galaxyadvisors. org and sign in to your [employer] account. Enter G865 in the [facility] box to learn more about \"Postpartum: Care Instructions. \"     If you do not have an account, please click on the \"Sign Up Now\" link. Current as of: February 23, 2022               Content Version: 13.4  © 2006-2022 Healthwise, Incorporated. Care instructions adapted under license by [facility] ([facility]). If you have questions about a medical condition or this instruction, always ask your healthcare professional. [place] [address] any warranty or liability for your use of this information. Learning About Preeclampsia After Childbirth  What is preeclampsia?      Preeclampsia means that your blood pressure during pregnancy is higher than usual. You may also have other serious symptoms. Preeclampsia can be dangerous. When it is severe, it can cause seizures (eclampsia) or liver or kidney damage. When it affects the liver, it can cause HELLP syndrome, a blood-clotting and bleeding problem. HELLP can come on quickly and can be deadly. This is why your doctor checks you and your baby often. Preeclampsia usually occurs after 20 weeks of pregnancy. Most often, it starts near the end of pregnancy and goes away after childbirth. But symptoms may last a few weeks or more and can get worse after delivery. Rarely, symptoms of preeclampsia don't show up until days or even weeks after childbirth. What are the symptoms? Mild preeclampsia usually doesn't cause symptoms. But preeclampsia can cause rapid weight gain and sudden swelling of the hands and face. Severe preeclampsia does cause symptoms. It can cause a very bad headache and trouble seeing and breathing. It also can cause belly pain. You may also urinate less than usual.  What can you expect after you have had preeclampsia? In the hospital  After the baby and the placenta are delivered, preeclampsia usually starts to improve. Most women get better in the first few days after childbirth. After having preeclampsia, you still have a risk of seizures for a day or more after childbirth. (Very rarely, seizures happen later on.) So your doctor may have you take magnesium sulfate for a day or more to prevent seizures. You may also take medicine to lower your blood pressure. When you go home  Your blood pressure will most likely return to normal a few days after delivery. Your doctor will want to check your blood pressure sometime in the first week after you leave the hospital.  Some women still have high blood pressure 6 weeks after childbirth. But most return to normal levels over the long term. Take and record your blood pressure at home if your doctor tells you to.   Ask your doctor to check your blood pressure monitor to be sure that it is accurate and that the cuff fits you. Also ask your doctor to watch you use it, to make sure that you are using it right. You should not eat, use tobacco products, or use medicine known to raise blood pressure (such as some nasal decongestant sprays) before you take your blood pressure. Avoid taking your blood pressure if you have just exercised. Also avoid taking it if you are nervous or upset. Rest at least 15 minutes before you take your blood pressure. Be safe with medicines. If you take medicine, take it exactly as prescribed. Call your doctor if you think you are having a problem with your medicine. Do not smoke. Quitting smoking will help improve your baby's growth and health. If you need help quitting, talk to your doctor about stop-smoking programs and medicines. These can increase your chances of quitting for good. Eat a balanced and healthy diet that has lots of fruits and vegetables. Long-term health  After you have had preeclampsia, you have a higher-than-average risk of heart disease, stroke, and kidney disease. This may be because the same things that cause preeclampsia also cause heart and kidney disease. To protect your health, work with your doctor on living a heart-healthy lifestyle and getting the checkups you need. Your doctor may also want you to check your blood pressure at home. Follow-up care is a key part of your treatment and safety. Be sure to make and go to all appointments, and call your doctor if you are having problems. It's also a good idea to know your test results and keep a list of the medicines you take. When should you call for help? Share this information with your partner or a friend. They can help you watch for warning signs. Call 911  anytime you think you may need emergency care. For example, call if:    You passed out (lost consciousness). You have a seizure.    Call your doctor now or seek immediate medical care if:    You have symptoms of preeclampsia, such as:  Sudden swelling of your face, hands, or feet. New vision problems (such as dimness, blurring, or seeing spots). A severe headache. Your blood pressure is very high, such as 160/110 or higher. Your blood pressure is higher than your doctor told you it should be, or it rises quickly. You have new nausea or vomiting. You have pain in your belly or pelvis. Watch closely for changes in your health, and be sure to contact your doctor if:    You gain weight rapidly. Where can you learn more? Go to https://chpepiceweb.Damballa. org and sign in to your [employer] account. Enter J779 in the [employer] box to learn more about \"Learning About Preeclampsia After Childbirth. \"     If you do not have an account, please click on the \"Sign Up Now\" link. Current as of: February 23, 2022               Content Version: 13.4  © 2006-2022 [employer]. Care instructions adapted under license by [facility] ([facility]). If you have questions about a medical condition or this instruction, always ask your healthcare professional. [place] [address] any warranty or liability for your use of this information. Depression After Childbirth: Care Instructions  It's common to lose sleep, feel irritable, and cry easily during the first few days after childbirth. Hormone changes and the demands of a new baby can cause these \"baby blues. \" If these mood changes last more than 2 weeks, you may have postpartum depression. This is a medical condition that requires treatment. If you have any of these signs, you may be depressed. See your doctor right away. You feel very sad or hopeless and lose interest in daily activities. You sleep too much or not enough. You feel tired or as if you have no energy. You eat too much or too little. You write or talk about death.      Where to get help 24 hours a day, 7 days a week   If you or someone you know talks about suicide, self-harm, a mental health crisis, a substance use crisis, or any other kind of emotional distress, get help right away. You can:   Call the Suicide and Crisis Lifeline at 65. Call 5-714-388-TALK (). 7-705.713.1392    Text HOME to 029074 to access the Crisis Text Line. Consider saving these numbers in your phone. What you can do   Try to go to all of your counseling sessions. Take medicines as directed. Eat healthy foods. Get daily exercise, such as walks. Try to get some sunlight every day. Avoid using alcohol or other substances. Get as much rest as possible. Connect with friends, and join a support group for new parents. When should you call for help? Call 911 if: You feel you cannot stop from hurting yourself, your baby, or someone else. Call your doctor now or seek immediate medical care if:    You are having trouble caring for yourself or your baby. You hear voices. Contact your doctor if:    You have problems with your medicines. You do not get better as expected. Follow-up care is a key part of your treatment and safety. Be sure to make and go to all appointments, and call your doctor if you are having problems. It's also a good idea to know your test results and keep a list of the medicines you take. Where can you learn more? Go to https://chmadonna.healthPlacements.io. org and sign in to your [employer] account. Enter Q105 in the [facility] box to learn more about \"Depression After Childbirth: Care Instructions. \"     If you do not have an account, please click on the \"Sign Up Now\" link. Current as of: February 9, 2022               Content Version: 13.4  © 3390-0496 Healthwise, Incorporated. Care instructions adapted under license by [facility] ([facility]).  If you have questions about a medical condition or this instruction, always ask your healthcare professional. Jony Reyes any warranty or liability for your use of this information.

## 2022-10-15 NOTE — PROGRESS NOTES
POST PARTUM DAY # 1    Linda Smith is a 20 y.o. female  This patient was seen & examined today.  10/15/22    Her pregnancy was complicated by:   Patient Active Problem List   Diagnosis    Gestational hypertension (G2)     10/15/22 F Apg 8/9 Wt 7#1    39 weeks gestation of pregnancy       Today she is doing well without any chief complaint. Her lochia is light. She denies chest pain, shortness of breath, headache, lightheadedness, blurred vision, peripheral edema and palpitations. She is  breast feeding and she denies any breast tenderness. She is ambulating well. Her voiding pattern is normal. I reviewed signs and symptoms of post partum depression with the patient, she currently denies any of these symptoms. She is tolerating solids.      Vital Signs:  Vitals:    10/15/22 0430 10/15/22 0445 10/15/22 0600 10/15/22 0830   BP: 134/83 130/79 137/78 121/81   Pulse: 89 71 67 69   Resp: 16 16 16 16   Temp:  98.8 °F (37.1 °C) 97.8 °F (36.6 °C) 97.7 °F (36.5 °C)   TempSrc:  Oral Oral Oral   SpO2:   98% 99%         Physical Exam:  General:  no apparent distress, alert and cooperative  Neurologic:  alert, oriented, normal speech, no focal findings  Lungs:  No increased work of breathing, good air exchange, clear to auscultation bilaterally, no crackles or wheezing  Heart:  normal S1 and S2 and regular rate and rhythm    Abdomen: abdomen soft, non-distended, non-tender  Fundus: non-tender, normal size, firm, below umbilicus  Extremities:  no calf tenderness, non edematous     Lab:  Lab Results   Component Value Date    HGB 12.7 10/13/2022     Lab Results   Component Value Date    HCT 36.4 10/13/2022       Assessment/Plan:  Linda Smith is a [id number] PPD # 1 s/p    - Doing well  - VSS, Afebrile   - female infant in [place] [id number] [place]   - Encourage ambulation   - Motrin/Tylenol    - Postpartum Hgb/Hct if symptomatic   Rh positive/Rubella non-immune  - MMR vaccine ordered  Breast feeding    - Denies s/s mastitis at this time  gHTN  - BP largely normotensive since delivery. No severe range blood pressures this admission  - Denies s/s PreE at this time  - PreE labs wnl P:C 0.11  - Recommended outpatient follow up in one week for BP check  Continue post partum care    Counseling Completed:  Secondary Smoke risks and Sudden Infant Death Syndrome were reviewed with recommendations. Infant sleeping, \"back to sleep\" and avoidance of co-sleeping recommendations were reviewed. Signs and Symptoms of Post Partum Depression were reviewed. The patient is to call if any occur. Signs and symptoms of Mastitis were reviewed. The patient is to call if any occur for follow up.   Discharge instructions including pelvic rest, no driving with pain medicine and office follow-up were reviewed with patient     Attending Physician: Dr. Roxann Mcnamara DO  Ob/Gyn Resident   10/15/2022, 9:27 AM

## 2022-10-15 NOTE — FLOWSHEET NOTE
Writer bedside to assess patient and infant, patient denies pain or discomfort at this time. Patient states she is \"ready to leave soon her ride is on the way\". Writer updated OB and Matty Sweeney.

## 2022-10-15 NOTE — PROGRESS NOTES
Labor Progress Note    Qiana Stuart is a 20 y.o. female  at 39w1d  The patient was seen and examined. Her pain is not well controlled. She is declining further pain management at this time. She reports fetal movement is present, complains of contractions, complains of loss of fluid, denies vaginal bleeding. Denies s/s of preeclampsia including headache vision changes, difficulty breathing, chest pain, RUQ pain or worsening swelling of extremities. Vital Signs:  Vitals:    10/14/22 2100 10/14/22 2200 10/14/22 2300 10/15/22 0000   BP: (!) 141/88 (!) 133/93 (!) 151/93 (!) 154/97   Pulse: 65 80 64 67   Resp: 16      Temp:       TempSrc:       SpO2:             FHT:  125 , moderate variability, accelerations present, decelerations absent  Contractions: regular, every 2-3 minutes  Cervical Exam: 4-5 cm dilated, 80 effaced, -2 station  Pitocin: @ 18 mu/min    Chaperone for Exam  Chaperone was present.  Chaperone Josefina ZAMARRIPA        Membranes: Ruptured clear fluid  Scalp Electrode in place: absent  Intrauterine Pressure Catheter in Place: absent    Interventions: SVE    Assessment/Plan:  Qiana Stuart is a 20 y.o. female  at 39w1d admitted for IOL 2/2 gHTN   - GBS unknown  - No indication for GBS prophylaxis  - Patient declined GBS swab on admission   - VSS, Afebrile    - IVF LR @ 125 cc/hr   - Pitocin @ 1015  - SROM (clr) @ 0000  - Cytotec 25 mcg PO x 2  - Nubain x 1  - MMR PP  - PreE labs wnl; P/C 0.11   - CEFM/ TOCO   - Diet: CLD      Attending updated and in agreement with plan    Esau Gipson DO  Ob/Gyn Resident  10/15/2022, 12:43 AM

## 2022-10-15 NOTE — PROGRESS NOTES
Obstetric/Gynecology Resident Interval Note    RN reports patient called out with SROM. RN states towel and pad were wet and Nitrazine positive. The patient did not tolerate the previous SVE well and therefore exam was difficult. She is also asking for something for pain control at this time. Discussed nubain and patient amenable. Patient amenable to SVE after nubain as well.      Hermelinda Valentin DO  OB/GYN Resident, [facility]  10/15/2022, 12:12 AM

## 2022-10-15 NOTE — L&D DELIVERY NOTE
Mother's Information      Labor Events      Cervical Ripening:   Now               Haja Standing Girl Reina Mcintosh [[id number]]      Labor Events      Cervical Ripening Date/Time:       Rupture Date/Time: 10/15/22 01:34:00   Rupture Type: SROM  Fluid Color: Clear  Fluid Odor: None       Anesthesia    Method: Epidural       Start Pushing      Labor onset date/time:   Now     Dilation complete date/time:   Now     Start pushing date/time:    Decision date/time (emergent ):           Delivery ()      Delivery Date/Time:  10/15/22 02:46:00   Delivery Type: Vaginal, Spontaneous    Details:             Presentation    Presentation: Vertex  Position: Right  _: Occiput  _: Anterior       Shoulder Dystocia    Shoulder Dystocia Present?: No  Add Second Maneuver  Add Third Maneuver  Add Fourth Maneuver  Add Fifth Maneuver  Add Sixth Maneuver  Add Seventh Maneuver  Add Eighth Maneuver  Add Ninth Maneuver       Assisted Delivery Details    Forceps Attempted?: No  Vacuum Extractor Attempted?: No       Document Additional Attempt         Document Additional Attempt                 Cord    Vessels: 3 Vessels  Complications: None  Delayed Cord Clamping?: Yes  Cord Clamped Date/Time: 10/15/2022 02:50:00  Cord Blood Disposition: Lab  Gases Sent?: Yes       Placenta    Date/Time: 10/15/2022 05:53:00  Removal: Spontaneous  Appearance: Intact  Disposition: Lab, Pathology       Lacerations    Episiotomy: Right Mediolateral  Perineal Lacerations: None  Other Lacerations: no non-perineal laceration       Vaginal Counts    Initial Count Personnel: AMISHA    Sponges [place] Instruments   Initial Counts      Final Counts      Final Count Verified By: Rosario Red  Accurate Final Count?: Yes  If the count is incorrect due to Intentionally Retained Foreign Object (IRFO) add the IRFO LDA in Lines/Drains.   Add LDA: Link to [facility]       Blood Loss  Mother: Billy Herring #[id number]     Start of Mother's Information Delivery Blood Loss  10/14/22 1446 - 10/15/22 0317      Quantitative Blood Loss (mL) Hospital Encounter 300 grams    Total  300 mL               End of Mother's Information  Mother: Sebas Machado #[id number]                Delivery Providers    Delivering clinician: Maurisio Castle MD     Provider Role    Maurisio Castle MD [address], RN Primary Nurse     Primary  Nurse     NICU Nurse     Neonatologist     Anesthesiologist     Nurse Anesthetist     Nurse Practitioner     Midwife    Lester Delvalle, RN Nursery Nurse    Pedro Luis Bernstein DO Resident               Assessment    Living Status: Living     Apgar Scoring Key:    0 1 2    Skin Color: Blue or pale Acrocyanotic Completely pink    Heart Rate: Absent <100 bpm >100 bpm    Reflex Irritability: No response Grimace Cry or active withdrawal    Muscle Tone: Limp Some flexion Active motion    Respiratory Effort: Absent Weak cry; hypoventilation Good, crying                      Skin Color:   Heart Rate:   Reflex Irritability:   Muscle Tone:   Respiratory Effort:    Total:            1 Minute:    0    2    2    2    2    8        Apgar 1 total from OB History    5 Minute:    1    2    2    2    2    9        Apgar 5 total from OB History    10 Minute:              15 Minute:              20 Minute:                        Apgars Assigned By: Emile Wright              Resuscitation    Method: Bulb Suction             [place] Measurements      Birth Weight: 5181 g Birth Length: 0.489 m              Title      Skin to Skin Initiation Date/Time:       Skin to Skin End Date/Time:                  Vaginal Delivery Note  Department of Obstetrics and Gynecology  [facility]       Patient: Rose Mary Robbins   : 2000  MRN: [id number]   Date of delivery: 10/15/22     Pre-operative Diagnosis: Rose Mary Robbins  at [address] [facility] 200 2/2 gHTN   History spontaneous miscarriage x 1    Post-operative Diagnosis:  same as above and  living infant female    Delivering Obstetrician & Assistant(s): Noe Terry, DO PGY4    Infant Information:   Information for the patient's :  Pavel Garrido [[id number]]      Information for the patient's :  Pavel Garrido [[id number]]        Apgar scores: 8 at 1 minute and 9 at 5 minutes. Anesthesia:  epidural anesthesia    Application and Delivery:    She was known to be GBS unknown and did not receive antibiotic prophylaxis. The patient progressed well, received an epidural, became complete and felt the urge to push. While pushing with contractions it was noted that the patient had a prior right mediolateral episiotomy. The scar tissue was thick and not allowing delivery of the fetal head. The patient was consented and a small cut was made at the superior most portion of the prior RML. The fetal head then delivered Cephalic, right occiput anterior over a RML, nuchal cord was not present. The anterior, then posterior shoulder delivered easily and atraumatically followed by the rest of the infant. Nose and mouth suctioned with bulb suction, infant was stimulated and dried. Cord was clamped and cut after three minutes delayed cord clamping (at the patient's request) and infant was placed on the mother's abdomen, and attended by RN for evaluation. The delivery of the placenta was spontaneous and appeared intact, whole, and that the umbilical cord had three vessels noted. Pitocin was started. The vagina was swept of all clots and debris. The perineum and vagina were evaluated and a right mediolateral episiotomy laceration was found and repaired in standard fashion with 3-0 vicryl. Mother and baby tolerated procedure well. Dr. Luan Molina was present for entire delivery.     Delivery Summary:       Specimen: cord blood and cord gases  Quantitative blood loss:  300ml immediately following delivery  Condition:  infant stable to general nursery and mother stable  Counts: instrument and sponge counts correct  Blood Type and Rh: O POSITIVE    Rubella Immunity Status: non-immune  Infant Feeding: breast feeding    Marciano Carmen DO  Ob/Gyn Resident  10/15/2022, 3:18 AM

## 2022-10-15 NOTE — PROGRESS NOTES
Labor Progress Note    Zina Villaseñor is a 20 y.o. female  at 39w0d  The patient was seen and examined. Her pain is well controlled. She reports fetal movement is present, complains of contractions, denies loss of fluid, denies vaginal bleeding.        Vital Signs:  Vitals:    10/14/22 1900 10/14/22 2000 10/14/22 2100 10/14/22 2200   BP: (!) 131/94 (!) 143/82 (!) 141/88 (!) 133/93   Pulse: 79 73 65 80   Resp: 16 15 16    Temp:       TempSrc:       SpO2:           FHT: 130, moderate variability, accelerations present, decelerations absent  Contractions: regular, every 2 minutes    Chaperone for Intimate Exam: Chaperone was present for entire exam, Chaperone Name: Teagan Jones RN  Cervical Exam: 3 cm dilated, 80 effaced, -2 station  Pitocin: @ 20 mu/min    Membranes: Intact  Scalp Electrode in place: absent  Intrauterine Pressure Catheter in Place: absent    Interventions: none    Assessment/Plan:  Zina Villaseñor is a 20 y.o. female  at 39w0d admitted for IOL 2/2 gHTN    - GBS unknown, patient declined prophylaxis for GBS prophylaxis  - VSS, Afebrile  - cEFM/TOCO  - S/p Cytotec 25 mcg PO x 1  - Pitocin per protocol   - Anticipate vaginal delivery  - Continue to monitor          Attending updated and in agreement with plan    Yvan Mccoy DO  Ob/Gyn Resident  10/14/2022, 11:07 PM'

## 2022-10-15 NOTE — PROGRESS NOTES
Obstetric/Gynecology Resident Interval Note    Patient requesting discharge home. ED social work saw patient and cleared patient for discharge from social work standpoint. Patient aware that she will have to sign out baby [address], however patient is stable for discharge from a postpartum standpoint. Will place discharge orders. Patient plans to return to [facility] and receive care from her midwife there.  Patient declined MMR    Vitals:    10/15/22 0430 10/15/22 0445 10/15/22 0600 10/15/22 0830   BP: 134/83 130/79 137/78 121/81   Pulse: 89 71 67 69   Resp: 16 16 16 16   Temp:  98.8 °F (37.1 °C) 97.8 °F (36.6 °C) 97.7 °F (36.5 °C)   TempSrc:  Oral Oral Oral   SpO2:   98% 99%       Discussed with Dr. Shirley Harrell DO  OB/GYN Resident, PGY3  [address]  10/15/2022, 10:59 AM

## 2022-10-15 NOTE — FLOWSHEET NOTE
Ester at bedside for epidural. Pt positioned sitting on side of bed. Consent obtained, time out [id number]. Procedure [id number], catheter [id number], test [id number]. Pt positioned left laeral tilt. Pt tolerated well. Rn will continue to monitor.

## 2022-10-15 NOTE — CARE COORDINATION
SW met with Romulo Padilla and her , Marilynn North, to discuss discharge and desire to leave AMA. Parents are Shinto and would have delivered at home with the help of their Shinto midwife but Romulo Padilla had an issue with blood pressure with the prior pregnancy. The midwife wanted delivery of this baby, Marta Pham, to be at the hospital.  Parents stated that their community midwife will be available to see and examine baby upon their return home today. Their midwife will also be available to examine baby tomorrow morning. SW discussed the reasoning for keeping baby for 48 hours and discussed risks of taking baby home before the end of the observation period. Parents express understanding and want to continue with their plan to discharge and have baby examined by their midwife. SW assisted parents with filling out the birth certificate form. Parents report having several family members for support in their community. They have all necessary baby items. SW discussed safe sleep for baby. Parents expressed understanding. Parents will have a ride coming this afternoon around 1PM.  Their ride is bring the baby's car seat. SW did not have any concerns at this time.

## 2022-10-15 NOTE — PROGRESS NOTES
Labor Progress Note    Luis F Hanna is a 20 y.o. female  at 39w1d  The patient was seen and examined. Her pain is not well controlled. She is requesting an exam first and then an epidural. She reports fetal movement is present, complains of contractions, complains of loss of fluid, denies vaginal bleeding. Denies s/s of preeclampsia including headache vision changes, difficulty breathing, chest pain, RUQ pain or worsening swelling of extremities. Vital Signs:  Vitals:    10/14/22 2100 10/14/22 2200 10/14/22 2300 10/15/22 0000   BP: (!) 141/88 (!) 133/93 (!) 151/93 (!) 154/97   Pulse: 65 80 64 67   Resp: 16      Temp:       TempSrc:       SpO2:             FHT:  125 , moderate variability, accelerations present, decelerations variable  Contractions: regular, every 1-3 minutes  Cervical Exam: 6-7 cm dilated, 80 effaced, -2 station  Pitocin: @ 20 mu/min    Chaperone for Exam  Chaperone was present.  Chaperone Josefina ZAMARRIPA    Membranes: Ruptured clear fluid  Scalp Electrode in place: absent  Intrauterine Pressure Catheter in Place: absent    Interventions: SVE    Assessment/Plan:  Luis F Hanna is a 20 y.o. female  at 39w1d admitted for IOL 2/2 gHTN   - GBS unknown  - No indication for GBS prophylaxis  - Patient declined GBS swab on admission   - VSS, Afebrile    - IVF LR @ 125 cc/hr   - Epidural ordered   - Pitocin @ 1015  - SROM (clr) @ 0000  - Cytotec 25 mcg PO x 2  - Nubain x 1  - MMR PP  - PreE labs wnl; P/C 0.11   - CEFM/ TOCO   - Diet: CLD      Attending updated and in agreement with [address] [place] [address], DO  Ob/Gyn Resident  10/15/2022, 1:35 AM

## 2022-10-15 NOTE — ANESTHESIA PRE PROCEDURE
 acetaminophen (TYLENOL) tablet 1,000 mg  1,000 mg Oral Q4H PRN Spencer Bellamy DO   1,000 mg at 10/14/22 0026       Allergies:  No Known Allergies    Problem List:    Patient Active Problem List   Diagnosis Code    High-risk pregnancy, unspecified trimester O09.90    Gestational hypertension (G2) O13.9       Past Medical History:        Diagnosis Date    Gestational hypertension (G2) 10/14/2022       Past Surgical History:  History reviewed. No pertinent surgical history.     Social History:    Social History     Tobacco Use    Smoking status: Never    Smokeless tobacco: Never   Substance Use Topics    Alcohol use: Never                                Counseling given: Not Answered      Vital Signs (Current):   Vitals:    10/14/22 2100 10/14/22 2200 10/14/22 2300 10/15/22 0000   BP: (!) 141/88 (!) 133/93 (!) 151/93 (!) 154/97   Pulse: 65 80 64 67   Resp: 16      Temp:       TempSrc:       SpO2:                                                  BP Readings from Last 3 Encounters:   10/15/22 (!) 154/97       NPO Status:                                                                                 BMI:   Wt Readings from Last 3 Encounters:   No data found for Wt     There is no height or weight on file to calculate BMI.    CBC:   Lab Results   Component Value Date/Time    WBC 7.5 10/13/2022 05:26 PM    RBC 4.26 10/13/2022 05:26 PM    HGB 12.7 10/13/2022 05:26 PM    HCT 36.4 10/13/2022 05:26 PM    MCV 85.4 10/13/2022 05:26 PM    RDW 13.6 10/13/2022 05:26 PM     10/13/2022 05:26 PM       CMP:   Lab Results   Component Value Date/Time     10/13/2022 05:26 PM    K 3.9 10/13/2022 05:26 PM     10/13/2022 05:26 PM    CO2 22 10/13/2022 05:26 PM    BUN 10 10/13/2022 05:26 PM    CREATININE 0.53 10/13/2022 05:26 PM    LABGLOM >60 10/13/2022 05:26 PM    GLUCOSE 92 10/13/2022 05:26 PM    PROT 5.8 10/13/2022 05:26 PM    CALCIUM 9.2 10/13/2022 05:26 PM    BILITOT 0.2 10/13/2022 05:26 PM    ALKPHOS 117 10/13/2022 05:26 PM    AST 16 10/13/2022 05:26 PM    ALT 17 10/13/2022 05:26 PM       POC Tests: No results for input(s): POCGLU, POCNA, POCK, POCCL, POCBUN, POCHEMO, POCHCT in the last 72 hours. Coags: No results found for: PROTIME, INR, APTT    HCG (If Applicable): No results found for: PREGTESTUR, PREGSERUM, HCG, HCGQUANT     ABGs: No results found for: PHART, PO2ART, MFB4HKF, OOK2DJP, BEART, O3GZNGDN     Type & Screen (If Applicable):  No results found for: LABABO, LABRH    Drug/Infectious Status (If Applicable):  Lab Results   Component Value Date/Time    HEPCAB NONREACTIVE 10/13/2022 05:26 PM       COVID-19 Screening (If Applicable): No results found for: COVID19        Anesthesia Evaluation  Patient summary reviewed and Nursing notes reviewed no history of anesthetic complications:   Airway: Mallampati: II  TM distance: >3 FB   Neck ROM: full  Mouth opening: > = 3 FB   Dental: normal exam         Pulmonary:normal exam                               Cardiovascular:Negative CV ROS  Exercise tolerance: good (>4 METS),   (+) hypertension: no interval change,                   Neuro/Psych:   Negative Neuro/Psych ROS              GI/Hepatic/Renal: Neg GI/Hepatic/Renal ROS            Endo/Other: Negative Endo/Other ROS                    Abdominal:             Vascular: negative vascular ROS. Other Findings:           Anesthesia Plan      epidural     ASA 2       Induction: intravenous. Anesthetic plan and risks discussed with patient.         Attending anesthesiologist reviewed and agrees with Preprocedure content                FIDE Fox CRNA   10/15/2022

## 2022-10-17 NOTE — ANESTHESIA POSTPROCEDURE EVALUATION
Department of Anesthesiology  Postprocedure Note    Patient: Korey Reina  MRN: [id number]  [place]: 2000  Date of evaluation: 10/17/2022      Procedure Summary     Date: 10/15/22 Room / Location:     Anesthesia Start: 0215 Anesthesia Stop: 0246    Procedure: Labor Analgesia Diagnosis:     Scheduled Providers:  Responsible Provider: Martin Roberson MD    Anesthesia Type: epidural ASA Status: 2          Anesthesia Type: No value filed.     Catherine Phase I:      Catherine Phase II:        Anesthesia Post Evaluation    Patient location during evaluation: bedside  Patient participation: complete - patient participated  Level of consciousness: awake  Airway patency: patent  Nausea & Vomiting: no nausea and no vomiting  Complications: no  Cardiovascular status: hemodynamically stable  Respiratory status: acceptable  Hydration status: stable  Comments: LMP 01/14/2022